# Patient Record
Sex: FEMALE | URBAN - METROPOLITAN AREA
[De-identification: names, ages, dates, MRNs, and addresses within clinical notes are randomized per-mention and may not be internally consistent; named-entity substitution may affect disease eponyms.]

---
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
## 2021-12-02 ENCOUNTER — HOSPITAL ENCOUNTER (EMERGENCY)
Age: 21
Discharge: HOME | End: 2021-12-03
Attending: EMERGENCY MEDICINE
Payer: COMMERCIAL

## 2021-12-02 VITALS
TEMPERATURE: 98.1 F | DIASTOLIC BLOOD PRESSURE: 93 MMHG | OXYGEN SATURATION: 98 % | RESPIRATION RATE: 18 BRPM | WEIGHT: 138.01 LBS | BODY MASS INDEX: 24.45 KG/M2 | HEIGHT: 63 IN | SYSTOLIC BLOOD PRESSURE: 139 MMHG | HEART RATE: 93 BPM

## 2021-12-02 DIAGNOSIS — T78.40XA ALLERGIC REACTION, INITIAL ENCOUNTER: Primary | ICD-10-CM

## 2021-12-02 PROCEDURE — 96375 TX/PRO/DX INJ NEW DRUG ADDON: CPT

## 2021-12-02 PROCEDURE — 96374 THER/PROPH/DIAG INJ IV PUSH: CPT

## 2021-12-02 PROCEDURE — 74011250636 HC RX REV CODE- 250/636: Performed by: EMERGENCY MEDICINE

## 2021-12-02 RX ORDER — PREDNISONE 20 MG/1
20 TABLET ORAL DAILY
Qty: 7 TABLET | Refills: 0 | Status: SHIPPED | OUTPATIENT
Start: 2021-12-02 | End: 2021-12-09

## 2021-12-02 RX ORDER — FAMOTIDINE 20 MG/1
20 TABLET, FILM COATED ORAL 2 TIMES DAILY
Qty: 20 TABLET | Refills: 0 | Status: SHIPPED | OUTPATIENT
Start: 2021-12-02

## 2021-12-02 RX ORDER — DIPHENHYDRAMINE HYDROCHLORIDE 50 MG/ML
25 INJECTION, SOLUTION INTRAMUSCULAR; INTRAVENOUS
Status: COMPLETED | OUTPATIENT
Start: 2021-12-02 | End: 2021-12-02

## 2021-12-02 RX ORDER — FAMOTIDINE 10 MG/ML
20 INJECTION INTRAVENOUS
Status: COMPLETED | OUTPATIENT
Start: 2021-12-02 | End: 2021-12-02

## 2021-12-02 RX ORDER — ONDANSETRON 2 MG/ML
6 INJECTION INTRAMUSCULAR; INTRAVENOUS
Status: DISCONTINUED | OUTPATIENT
Start: 2021-12-02 | End: 2021-12-03 | Stop reason: HOSPADM

## 2021-12-02 RX ORDER — EPINEPHRINE 1 MG/ML
0.3 INJECTION, SOLUTION, CONCENTRATE INTRAVENOUS
Status: COMPLETED | OUTPATIENT
Start: 2021-12-02 | End: 2021-12-03

## 2021-12-02 RX ADMIN — FAMOTIDINE 20 MG: 10 INJECTION, SOLUTION INTRAVENOUS at 22:26

## 2021-12-02 RX ADMIN — DIPHENHYDRAMINE HYDROCHLORIDE 25 MG: 50 INJECTION, SOLUTION INTRAMUSCULAR; INTRAVENOUS at 22:26

## 2021-12-02 RX ADMIN — METHYLPREDNISOLONE SODIUM SUCCINATE 125 MG: 125 INJECTION, POWDER, FOR SOLUTION INTRAMUSCULAR; INTRAVENOUS at 22:26

## 2021-12-02 NOTE — Clinical Note
[facility] EMERGENCY DEPT  [address]  [place] [id number] [phone]    Work/School Note    Date: 12/2/2021    To Whom It May concern:    Savannah Rios was seen and treated today in the emergency room by the following provider(s):  Attending Provider: Marine Duenas MD.      Savannah Rios is excused from work/school on 12/02/21 and 12/03/21. She is medically clear to return to work/school on 12/4/2021. Sincerely,          January Beltran.  MD Deyanira

## 2021-12-03 PROCEDURE — 96372 THER/PROPH/DIAG INJ SC/IM: CPT

## 2021-12-03 PROCEDURE — 74011250636 HC RX REV CODE- 250/636: Performed by: EMERGENCY MEDICINE

## 2021-12-03 PROCEDURE — 99282 EMERGENCY DEPT VISIT SF MDM: CPT

## 2021-12-03 RX ADMIN — EPINEPHRINE 0.3 MG: 1 INJECTION INTRAMUSCULAR; INTRAVENOUS; SUBCUTANEOUS at 00:16

## 2021-12-03 NOTE — ED PROVIDER NOTES
EMERGENCY DEPARTMENT HISTORY AND PHYSICAL EXAM          Date: 12/2/2021  Patient Name: Elvin Barrios    History of Presenting Illness     Chief Complaint   Patient presents with    Allergic Reaction     Patient ambulatory reports she thinks she got cross contamination of some nuts at school about 1 hr ago. She reports she feels like her throat is closing. She states she has an epi pen but has not used it. History Provided By: Patient    HPI: Elvin Barrios is a 24 y.o. female, pmhx allergy to all nuts, who presents ambulatory to the ED c/o possible allergic reaction    Around 8 PM tonight patient drank a smoothie and noticed fairly shortly thereafter that she started feeling flushed with itching on her chest and her back of her throat felt tingly. She did not use her EpiPen as her symptoms were not severe but took 25 mg of Benadryl around 830. When everything did not resolve she decided to come to the ER for evaluation. Patient specifically denies any recent fevers, chills, nausea, vomiting, diarrhea, abd pain, CP, SOB, urinary sxs, changes in BM, or headache. PCP: Sukumar, MD Miles    Allergies: No known medication allergies  Social Hx: -tobacco, -vaping, +EtOH, -Illicit Drugs; There are no other complaints, changes, or physical findings at this time. Current Outpatient Medications   Medication Sig Dispense Refill    predniSONE (DELTASONE) 20 mg tablet Take 20 mg by mouth daily for 7 days. With Breakfast 7 Tablet 0    famotidine (Pepcid) 20 mg tablet Take 1 Tablet by mouth two (2) times a day. 20 Tablet 0       Past History     Past Medical History:  History reviewed. No pertinent past medical history. Past Surgical History:  History reviewed. No pertinent surgical history. Family History:  History reviewed. No pertinent family history.     Social History:  Social History     Tobacco Use    Smoking status: Not on file    Smokeless tobacco: Not on file   Substance Use Topics    Alcohol use: Not on file    Drug use: Not on file       Allergies: Allergies   Allergen Reactions    Nuts [Tree Nut] Rash         Review of Systems   Review of Systems   Constitutional: Negative for activity change, appetite change, chills, fever and unexpected weight change. HENT: Negative for congestion, facial swelling, sore throat and trouble swallowing. Eyes: Negative for pain and visual disturbance. Respiratory: Negative for cough and shortness of breath. Cardiovascular: Negative for chest pain. Gastrointestinal: Negative for abdominal pain, diarrhea, nausea and vomiting. Genitourinary: Negative for dysuria. Musculoskeletal: Negative for back pain. Skin: Positive for color change. Negative for rash. Neurological: Negative for headaches. Physical Exam   Physical Exam  Vitals and nursing note reviewed. Constitutional:       Appearance: She is well-developed. She is not diaphoretic. Comments: The appearing young female slightly elevated blood pressure, appears flushed, in mild acute distress   HENT:      Head: Normocephalic and atraumatic. Mouth/Throat:      Mouth: Mucous membranes are moist.      Tongue: No lesions. Pharynx: Uvula midline. Posterior oropharyngeal erythema and uvula swelling present. No pharyngeal swelling or oropharyngeal exudate. Comments: Uvula appears mildly erythematous and edematous. Is not deviated, airway is clear. Eyes:      General:         Right eye: No discharge. Left eye: No discharge. Conjunctiva/sclera: Conjunctivae normal.      Pupils: Pupils are equal, round, and reactive to light. Neck:      Vascular: No carotid bruit. Comments: There is no evidence of stridor  Cardiovascular:      Rate and Rhythm: Regular rhythm. Tachycardia present. Pulses: Normal pulses. Heart sounds: Normal heart sounds. No murmur heard.        Comments: Borderline tachycardia  Pulmonary:      Effort: Pulmonary effort is normal. No respiratory distress. Breath sounds: Normal breath sounds. No wheezing, rhonchi or rales. Abdominal:      General: Bowel sounds are normal. There is no distension. Palpations: Abdomen is soft. Tenderness: There is no abdominal tenderness. Musculoskeletal:         General: Normal range of motion. Cervical back: Normal range of motion and neck supple. No tenderness. Right lower leg: No edema. Left lower leg: No edema. Skin:     General: Skin is warm and dry. Findings: No rash. Neurological:      Mental Status: She is alert and oriented to person, place, and time. Cranial Nerves: No cranial nerve deficit. Motor: No abnormal muscle tone. Diagnostic Study Results     Labs -   No results found for this or any previous visit (from the past 12 hour(s)). Radiologic Studies -   No orders to display     CT Results  (Last 48 hours)    None        CXR Results  (Last 48 hours)    None            Medical Decision Making   I am the first provider for this patient. I reviewed the vital signs, available nursing notes, past medical history, past surgical history, family history and social history. Vital Signs-Reviewed the patient's vital signs. Patient Vitals for the past 12 hrs:   Temp Pulse Resp BP SpO2   12/02/21 2106 98.1 °F (36.7 °C) 93 18 (!) 139/93 98 %       Pulse Oximetry Analysis - 98% on RA    Records Reviewed: Nursing Notes    Provider Notes (Medical Decision Making):   MDM: Lamb Pinch female without significant history but with notable nut allergy presents with posterior pharyngeal erythema and uvular edema after drinking a smoothie. Do not see any evidence of hives but her face is flushed. She is already taken 25 of Benadryl so go ahead and place IV with Benadryl steroids and Pepcid and monitor uvula for improvement. ED Course:   Initial assessment performed.  The patients presenting problems have been discussed, and they are in agreement with the care plan formulated and outlined with them. I have encouraged them to ask questions as they arise throughout their visit. PROGRESS NOTE:    ED Course as of 12/03/21 0331   Thu Dec 02, 2021   2300 Patient reevaluated and doing okay. We discussed options of home management versus observation. She states she feels a little nervous about going home so we will continue to monitor in the ER. All flushing has resolved and her posterior pharynx is no longer erythematous but her uvula is still slightly edematous. [JT]   2340 Patient reevaluated. She states she still feels like something in her throat and is of her swallowing is being obstructed. We will try some IM epi. [JT]   Fri Dec 03, 2021   0025 Asked to see patient for abrasions. She appears well heart rate ranging between 93 and 96 bpm.  She states she feels it beating hard. I explained that that is most likely secondary to the epinephrine. She is not have any chest pain when she is swallowing easily. Continue monitoring. [JT]      ED Course User Index  [JT] Tanisha Marie., MD        Discharge note:  Pt re-evaluated and noted to be feeling better, ready for discharge. Will follow up as instructed. All questions have been answered, pt voiced understanding and agreement with plan. Specific return precautions provided as well as instructions to return to the ED should sx worsen at any time. Vital signs stable for discharge. Critical Care Time:   0      Diagnosis     Clinical Impression:   1. Allergic reaction, initial encounter        PLAN:  1. Current Discharge Medication List      START taking these medications    Details   predniSONE (DELTASONE) 20 mg tablet Take 20 mg by mouth daily for 7 days. With Breakfast  Qty: 7 Tablet, Refills: 0  Start date: 12/2/2021, End date: 12/9/2021      famotidine (Pepcid) 20 mg tablet Take 1 Tablet by mouth two (2) times a day. Qty: 20 Tablet, Refills: 0  Start date: 12/2/2021           2.    Follow-up Information     Follow up With Specialties Details Why Contact Info    [facility] EMERGENCY DEPT Emergency Medicine  If symptoms worsen [address]  [address] [facility]  [phone]        Return to ED if worse     Disposition:  Home       Please note, this dictation was completed with [email or web address], the computer voice recognition software. Quite often unanticipated grammatical, syntax, homophones, and other interpretive errors are inadvertently transcribed by the computer software. Please disregard these errors. Please excuse any errors that have escaped final proof reading.

## 2023-05-15 RX ORDER — FAMOTIDINE 20 MG/1
20 TABLET, FILM COATED ORAL 2 TIMES DAILY
COMMUNITY
Start: 2021-12-02

## 2024-04-02 ENCOUNTER — APPOINTMENT (RX ONLY)
Dept: URBAN - METROPOLITAN AREA CLINIC 4 | Facility: CLINIC | Age: 24
Setting detail: DERMATOLOGY
End: 2024-04-02

## 2024-04-02 DIAGNOSIS — D22 MELANOCYTIC NEVI: ICD-10-CM

## 2024-04-02 DIAGNOSIS — L70.0 ACNE VULGARIS: ICD-10-CM

## 2024-04-02 PROBLEM — D48.5 NEOPLASM OF UNCERTAIN BEHAVIOR OF SKIN: Status: ACTIVE | Noted: 2024-04-02

## 2024-04-02 PROCEDURE — 99203 OFFICE O/P NEW LOW 30 MIN: CPT | Mod: 25

## 2024-04-02 PROCEDURE — ? PRESCRIPTION MEDICATION MANAGEMENT

## 2024-04-02 PROCEDURE — ? COUNSELING

## 2024-04-02 PROCEDURE — ? PRESCRIPTION

## 2024-04-02 PROCEDURE — ? BIOPSY BY SHAVE METHOD

## 2024-04-02 PROCEDURE — 11102 TANGNTL BX SKIN SINGLE LES: CPT

## 2024-04-02 RX ORDER — TRETIONIN 0.5 MG/G
CREAM TOPICAL QHS
Qty: 20 | Refills: 3 | Status: ERX | COMMUNITY
Start: 2024-04-02

## 2024-04-02 RX ADMIN — TRETIONIN: 0.5 CREAM TOPICAL at 00:00

## 2024-04-02 ASSESSMENT — LOCATION ZONE DERM
LOCATION ZONE: TRUNK
LOCATION ZONE: FACE

## 2024-04-02 ASSESSMENT — LOCATION DETAILED DESCRIPTION DERM
LOCATION DETAILED: RIGHT CENTRAL MALAR CHEEK
LOCATION DETAILED: LEFT CENTRAL MALAR CHEEK
LOCATION DETAILED: RIGHT LATERAL UPPER BACK

## 2024-04-02 ASSESSMENT — LOCATION SIMPLE DESCRIPTION DERM
LOCATION SIMPLE: LEFT CHEEK
LOCATION SIMPLE: RIGHT CHEEK
LOCATION SIMPLE: RIGHT UPPER BACK

## 2024-04-02 NOTE — PROCEDURE: BIOPSY BY SHAVE METHOD

## 2024-04-02 NOTE — HPI: SKIN LESION
What Type Of Note Output Would You Prefer (Optional)?: Bullet Format
How Severe Is Your Skin Lesion?: mild
Has Your Skin Lesion Been Treated?: not been treated
Is This A New Presentation, Or A Follow-Up?: Mole
Additional History: \\nPt also c/o black heads on her chin and nose. No current tx.

## 2024-04-02 NOTE — PROCEDURE: PRESCRIPTION MEDICATION MANAGEMENT
Initiate Treatment: Tretinoin 0.05% cream apply to affected areas qhs as tolerated
Render In Strict Bullet Format?: No
Detail Level: Simple

## 2024-04-02 NOTE — PROCEDURE: COUNSELING
Azelaic Acid Counseling: Patient counseled that medicine may cause skin irritation and to avoid applying near the eyes.  In the event of skin irritation, the patient was advised to reduce the amount of the drug applied or use it less frequently.   The patient verbalized understanding of the proper use and possible adverse effects of azelaic acid.  All of the patient's questions and concerns were addressed.
High Dose Vitamin A Pregnancy And Lactation Text: High dose vitamin A therapy is contraindicated during pregnancy and breast feeding.
Doxycycline Counseling:  Patient counseled regarding possible photosensitivity and increased risk for sunburn.  Patient instructed to avoid sunlight, if possible.  When exposed to sunlight, patients should wear protective clothing, sunglasses, and sunscreen.  The patient was instructed to call the office immediately if the following severe adverse effects occur:  hearing changes, easy bruising/bleeding, severe headache, or vision changes.  The patient verbalized understanding of the proper use and possible adverse effects of doxycycline.  All of the patient's questions and concerns were addressed.
Topical Retinoid Pregnancy And Lactation Text: This medication is Pregnancy Category C. It is unknown if this medication is excreted in breast milk.
Winlevi Counseling:  I discussed with the patient the risks of topical clascoterone including but not limited to erythema, scaling, itching, and stinging. Patient voiced their understanding.
Aklief counseling:  Patient advised to apply a pea-sized amount only at bedtime and wait 30 minutes after washing their face before applying.  If too drying, patient may add a non-comedogenic moisturizer.  The most commonly reported side effects including irritation, redness, scaling, dryness, stinging, burning, itching, and increased risk of sunburn.  The patient verbalized understanding of the proper use and possible adverse effects of retinoids.  All of the patient's questions and concerns were addressed.
Include Pregnancy/Lactation Warning?: No
Azithromycin Pregnancy And Lactation Text: This medication is considered safe during pregnancy and is also secreted in breast milk.
Spironolactone Counseling: Patient advised regarding risks of diarrhea, abdominal pain, hyperkalemia, birth defects (for female patients), liver toxicity and renal toxicity. The patient may need blood work to monitor liver and kidney function and potassium levels while on therapy. The patient verbalized understanding of the proper use and possible adverse effects of spironolactone.  All of the patient's questions and concerns were addressed.
Isotretinoin Pregnancy And Lactation Text: This medication is Pregnancy Category X and is considered extremely dangerous during pregnancy. It is unknown if it is excreted in breast milk.
Dapsone Counseling: I discussed with the patient the risks of dapsone including but not limited to hemolytic anemia, agranulocytosis, rashes, methemoglobinemia, kidney failure, peripheral neuropathy, headaches, GI upset, and liver toxicity.  Patients who start dapsone require monitoring including baseline LFTs and weekly CBCs for the first month, then every month thereafter.  The patient verbalized understanding of the proper use and possible adverse effects of dapsone.  All of the patient's questions and concerns were addressed.
Tetracycline Counseling: Patient counseled regarding possible photosensitivity and increased risk for sunburn.  Patient instructed to avoid sunlight, if possible.  When exposed to sunlight, patients should wear protective clothing, sunglasses, and sunscreen.  The patient was instructed to call the office immediately if the following severe adverse effects occur:  hearing changes, easy bruising/bleeding, severe headache, or vision changes.  The patient verbalized understanding of the proper use and possible adverse effects of tetracycline.  All of the patient's questions and concerns were addressed. Patient understands to avoid pregnancy while on therapy due to potential birth defects.
Benzoyl Peroxide Pregnancy And Lactation Text: This medication is Pregnancy Category C. It is unknown if benzoyl peroxide is excreted in breast milk.
Topical Sulfur Applications Counseling: Topical Sulfur Counseling: Patient counseled that this medication may cause skin irritation or allergic reactions.  In the event of skin irritation, the patient was advised to reduce the amount of the drug applied or use it less frequently.   The patient verbalized understanding of the proper use and possible adverse effects of topical sulfur application.  All of the patient's questions and concerns were addressed.
Topical Clindamycin Counseling: Patient counseled that this medication may cause skin irritation or allergic reactions.  In the event of skin irritation, the patient was advised to reduce the amount of the drug applied or use it less frequently.   The patient verbalized understanding of the proper use and possible adverse effects of clindamycin.  All of the patient's questions and concerns were addressed.
Sarecycline Counseling: Patient advised regarding possible photosensitivity and discoloration of the teeth, skin, lips, tongue and gums.  Patient instructed to avoid sunlight, if possible.  When exposed to sunlight, patients should wear protective clothing, sunglasses, and sunscreen.  The patient was instructed to call the office immediately if the following severe adverse effects occur:  hearing changes, easy bruising/bleeding, severe headache, or vision changes.  The patient verbalized understanding of the proper use and possible adverse effects of sarecycline.  All of the patient's questions and concerns were addressed.
Birth Control Pills Counseling: Birth Control Pill Counseling: I discussed with the patient the potential side effects of OCPs including but not limited to increased risk of stroke, heart attack, thrombophlebitis, deep venous thrombosis, hepatic adenomas, breast changes, GI upset, headaches, and depression.  The patient verbalized understanding of the proper use and possible adverse effects of OCPs. All of the patient's questions and concerns were addressed.
Erythromycin Pregnancy And Lactation Text: This medication is Pregnancy Category B and is considered safe during pregnancy. It is also excreted in breast milk.
Azelaic Acid Pregnancy And Lactation Text: This medication is considered safe during pregnancy and breast feeding.
Minocycline Counseling: Patient advised regarding possible photosensitivity and discoloration of the teeth, skin, lips, tongue and gums.  Patient instructed to avoid sunlight, if possible.  When exposed to sunlight, patients should wear protective clothing, sunglasses, and sunscreen.  The patient was instructed to call the office immediately if the following severe adverse effects occur:  hearing changes, easy bruising/bleeding, severe headache, or vision changes.  The patient verbalized understanding of the proper use and possible adverse effects of minocycline.  All of the patient's questions and concerns were addressed.
Aklief Pregnancy And Lactation Text: It is unknown if this medication is safe to use during pregnancy.  It is unknown if this medication is excreted in breast milk.  Breastfeeding women should use the topical cream on the smallest area of the skin for the shortest time needed while breastfeeding.  Do not apply to nipple and areola.
Tazorac Counseling:  Patient advised that medication is irritating and drying.  Patient may need to apply sparingly and wash off after an hour before eventually leaving it on overnight.  The patient verbalized understanding of the proper use and possible adverse effects of tazorac.  All of the patient's questions and concerns were addressed.
Winlevi Pregnancy And Lactation Text: This medication is considered safe during pregnancy and breastfeeding.
Bactrim Counseling:  I discussed with the patient the risks of sulfa antibiotics including but not limited to GI upset, allergic reaction, drug rash, diarrhea, dizziness, photosensitivity, and yeast infections.  Rarely, more serious reactions can occur including but not limited to aplastic anemia, agranulocytosis, methemoglobinemia, blood dyscrasias, liver or kidney failure, lung infiltrates or desquamative/blistering drug rashes.
Doxycycline Pregnancy And Lactation Text: This medication is Pregnancy Category D and not consider safe during pregnancy. It is also excreted in breast milk but is considered safe for shorter treatment courses.
High Dose Vitamin A Counseling: Side effects reviewed, pt to contact office should one occur.
Dapsone Pregnancy And Lactation Text: This medication is Pregnancy Category C and is not considered safe during pregnancy or breast feeding.
Azithromycin Counseling:  I discussed with the patient the risks of azithromycin including but not limited to GI upset, allergic reaction, drug rash, diarrhea, and yeast infections.
Topical Retinoid counseling:  Patient advised to apply a pea-sized amount only at bedtime and wait 30 minutes after washing their face before applying.  If too drying, patient may add a non-comedogenic moisturizer. The patient verbalized understanding of the proper use and possible adverse effects of retinoids.  All of the patient's questions and concerns were addressed.
Topical Sulfur Applications Pregnancy And Lactation Text: This medication is Pregnancy Category C and has an unknown safety profile during pregnancy. It is unknown if this topical medication is excreted in breast milk.
Detail Level: Zone
Tetracycline Pregnancy And Lactation Text: This medication is Pregnancy Category D and not consider safe during pregnancy. It is also excreted in breast milk.
Isotretinoin Counseling: Patient should get monthly blood tests, not donate blood, not drive at night if vision affected, not share medication, and not undergo elective surgery for 6 months after tx completed. Side effects reviewed, pt to contact office should one occur.
Birth Control Pills Pregnancy And Lactation Text: This medication should be avoided if pregnant and for the first 30 days post-partum.
Spironolactone Pregnancy And Lactation Text: This medication can cause feminization of the male fetus and should be avoided during pregnancy. The active metabolite is also found in breast milk.
Topical Clindamycin Pregnancy And Lactation Text: This medication is Pregnancy Category B and is considered safe during pregnancy. It is unknown if it is excreted in breast milk.
Benzoyl Peroxide Counseling: Patient counseled that medicine may cause skin irritation and bleach clothing.  In the event of skin irritation, the patient was advised to reduce the amount of the drug applied or use it less frequently.   The patient verbalized understanding of the proper use and possible adverse effects of benzoyl peroxide.  All of the patient's questions and concerns were addressed.
Tazorac Pregnancy And Lactation Text: This medication is not safe during pregnancy. It is unknown if this medication is excreted in breast milk.
Erythromycin Counseling:  I discussed with the patient the risks of erythromycin including but not limited to GI upset, allergic reaction, drug rash, diarrhea, increase in liver enzymes, and yeast infections.
Bactrim Pregnancy And Lactation Text: This medication is Pregnancy Category D and is known to cause fetal risk.  It is also excreted in breast milk.